# Patient Record
Sex: MALE | Race: WHITE | NOT HISPANIC OR LATINO | Employment: OTHER | URBAN - METROPOLITAN AREA
[De-identification: names, ages, dates, MRNs, and addresses within clinical notes are randomized per-mention and may not be internally consistent; named-entity substitution may affect disease eponyms.]

---

## 2023-01-04 ENCOUNTER — APPOINTMENT (OUTPATIENT)
Dept: CT IMAGING | Facility: HOSPITAL | Age: 73
End: 2023-01-04
Payer: MEDICARE

## 2023-01-04 ENCOUNTER — HOSPITAL ENCOUNTER (EMERGENCY)
Facility: HOSPITAL | Age: 73
Discharge: HOME | End: 2023-01-04
Attending: EMERGENCY MEDICINE | Admitting: EMERGENCY MEDICINE
Payer: MEDICARE

## 2023-01-04 VITALS
TEMPERATURE: 98.6 F | SYSTOLIC BLOOD PRESSURE: 142 MMHG | DIASTOLIC BLOOD PRESSURE: 84 MMHG | HEART RATE: 62 BPM | BODY MASS INDEX: 22.43 KG/M2 | RESPIRATION RATE: 17 BRPM | HEIGHT: 68 IN | OXYGEN SATURATION: 97 % | WEIGHT: 148 LBS
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
## 2023-01-04 DIAGNOSIS — S06.0X0A CONCUSSION WITHOUT LOSS OF CONSCIOUSNESS, INITIAL ENCOUNTER: Primary | ICD-10-CM

## 2023-01-04 DIAGNOSIS — S00.83XA CONTUSION OF FACE, INITIAL ENCOUNTER: ICD-10-CM

## 2023-01-04 PROCEDURE — 70450 CT HEAD/BRAIN W/O DYE: CPT

## 2023-01-04 PROCEDURE — 70486 CT MAXILLOFACIAL W/O DYE: CPT

## 2023-01-04 PROCEDURE — 99283 EMERGENCY DEPT VISIT LOW MDM: CPT

## 2023-01-04 NOTE — ED PROVIDER NOTES
Subjective   History of Present Illness  Patient is a 72-year-old male who had slipped and fallen last night.  He complains of pain to his head and face.  Denies loss of consciousness dizziness vomiting or other complaint        Review of Systems  Negative for dizziness vomiting loss of consciousness neck pain chest pain shortness of breath and arm pain back pain extremity pain or other complaint of injury  Past Medical History:   Diagnosis Date   • Seizures (HCC)        No Known Allergies    History reviewed. No pertinent surgical history.    History reviewed. No pertinent family history.    Social History     Socioeconomic History   • Marital status:            Objective   Physical Exam  Neurologic exam is nonfocal.  HEENT exam shows patient have facial swelling ecchymosis as well as right periorbital ecchymosis.  Extraocular muscles are intact.  Neck is nontender.  Lungs are clear.  Chest is nontender.  Abdomen soft nontender.  Back has no tenderness.  Extremity exam has no swelling or ecchymosis.  Procedures           ED Course      CT Head Without Contrast    Result Date: 1/4/2023  Impression: 1. Motion limited study. 2. Right frontal and supraorbital scalp contusion and hematoma. 3. Chronic small vessel ischemic change. Electronically Signed: Raul Charlton  1/4/2023 10:19 AM EST  Workstation ID: [id number]    CT Facial Bones Without Contrast    Result Date: 1/4/2023  Impression: 1. Right frontal and right supraorbital soft tissue contusion with right frontal scalp hematoma. No fracture. 2. No fracture. Electronically Signed: Raul Charlton  1/4/2023 10:21 AM EST  Workstation ID: [id number]                                         Medical Decision Making  Patient had fallen and has acute head pain and face pain.  He is noted to have facial contusions on CT scan.  There is no intracranial abnormality in my evaluation of the patient CT scan of his head without contrast.  Patient has no other focal tenderness  swelling or ecchymosis.  Patient does have comorbidities including seizures but he states he does not believe he had a seizure last night.  Patient will be discharged.  He is Tonna for pain and ice for swelling.  We will follow his MD for further outpatient evaluation.  Patient has not meet admission requirements as he has had normal neurologic status throughout his ED visit.    Amount and/or Complexity of Data Reviewed  Radiology: ordered. Decision-making details documented in ED Course.          Final diagnoses:   Concussion without loss of consciousness, initial encounter   Contusion of face, initial encounter   Fall, initial encounter       ED Disposition  ED Disposition     ED Disposition   Discharge    Condition   Stable    Comment   --             No follow-up provider specified.       Medication List      No changes were made to your prescriptions during this visit.          Matt Stewart MD  01/04/23 4003